# Patient Record
Sex: MALE | Race: WHITE | NOT HISPANIC OR LATINO
[De-identification: names, ages, dates, MRNs, and addresses within clinical notes are randomized per-mention and may not be internally consistent; named-entity substitution may affect disease eponyms.]

---

## 2021-03-17 PROBLEM — Z00.00 ENCOUNTER FOR PREVENTIVE HEALTH EXAMINATION: Status: ACTIVE | Noted: 2021-03-17

## 2021-03-24 ENCOUNTER — APPOINTMENT (OUTPATIENT)
Dept: PULMONOLOGY | Facility: CLINIC | Age: 56
End: 2021-03-24
Payer: COMMERCIAL

## 2021-03-24 VITALS
HEART RATE: 76 BPM | SYSTOLIC BLOOD PRESSURE: 126 MMHG | DIASTOLIC BLOOD PRESSURE: 76 MMHG | WEIGHT: 175 LBS | BODY MASS INDEX: 25.05 KG/M2 | OXYGEN SATURATION: 96 % | HEIGHT: 70 IN | RESPIRATION RATE: 14 BRPM

## 2021-03-24 DIAGNOSIS — R06.02 SHORTNESS OF BREATH: ICD-10-CM

## 2021-03-24 DIAGNOSIS — U07.1 COVID-19: ICD-10-CM

## 2021-03-24 DIAGNOSIS — Z82.3 FAMILY HISTORY OF STROKE: ICD-10-CM

## 2021-03-24 PROCEDURE — 99203 OFFICE O/P NEW LOW 30 MIN: CPT

## 2021-03-24 PROCEDURE — 99072 ADDL SUPL MATRL&STAF TM PHE: CPT

## 2021-03-24 NOTE — HISTORY OF PRESENT ILLNESS
[Shortness of Breath] : Shortness of Breath [Dyspnea] : dyspnea [Fatigue] : fatigue [Weakness] : weakness [Cough] : no cough [Wheezing] : no wheezing [Fever] : no fever [Hemoptysis] : no hemoptysis [Weight Gain] : weight gain [Leg Pain] : no leg pain [Edema] : no edema

## 2021-08-04 ENCOUNTER — APPOINTMENT (OUTPATIENT)
Dept: PULMONOLOGY | Facility: CLINIC | Age: 56
End: 2021-08-04

## 2021-08-04 ENCOUNTER — APPOINTMENT (OUTPATIENT)
Age: 56
End: 2021-08-04

## 2024-04-05 ENCOUNTER — APPOINTMENT (OUTPATIENT)
Dept: OTOLARYNGOLOGY | Facility: CLINIC | Age: 59
End: 2024-04-05
Payer: COMMERCIAL

## 2024-04-05 VITALS — BODY MASS INDEX: 24.68 KG/M2 | WEIGHT: 172 LBS

## 2024-04-05 DIAGNOSIS — H90.3 SENSORINEURAL HEARING LOSS, BILATERAL: ICD-10-CM

## 2024-04-05 PROCEDURE — 92550 TYMPANOMETRY & REFLEX THRESH: CPT | Mod: 52

## 2024-04-05 PROCEDURE — 92557 COMPREHENSIVE HEARING TEST: CPT

## 2024-04-05 PROCEDURE — 99203 OFFICE O/P NEW LOW 30 MIN: CPT

## 2024-04-05 NOTE — PHYSICAL EXAM
[Normal] : mucosa is normal [Midline] : trachea located in midline position [de-identified] : soft lipomatous mass left supraclavicular region over brachial plexus

## 2024-04-05 NOTE — ASSESSMENT
[FreeTextEntry1] : Audio reviewed - mild asymmetry in two frequencies, recheck in 6-12 mo RV after MRI

## 2024-04-05 NOTE — REASON FOR VISIT
[Initial Evaluation] : an initial evaluation for [FreeTextEntry2] : lump left side of neck  Speaking Coherently

## 2024-04-05 NOTE — HISTORY OF PRESENT ILLNESS
[de-identified] : Patient presents today c/o lump on left side of neck.  Patient had a US neck done 02/13/2024.  Has been present for several years, nonpainful, nonenlarging.  Also reports bilateral hearing loss. No otorrhea, otalgia, tinnitus, or vertigo.

## 2024-05-20 ENCOUNTER — TRANSCRIPTION ENCOUNTER (OUTPATIENT)
Age: 59
End: 2024-05-20

## 2024-05-20 ENCOUNTER — APPOINTMENT (OUTPATIENT)
Dept: OTOLARYNGOLOGY | Facility: CLINIC | Age: 59
End: 2024-05-20
Payer: COMMERCIAL

## 2024-05-20 VITALS — BODY MASS INDEX: 24.62 KG/M2 | HEIGHT: 70 IN | WEIGHT: 172 LBS

## 2024-05-20 DIAGNOSIS — R22.1 LOCALIZED SWELLING, MASS AND LUMP, NECK: ICD-10-CM

## 2024-05-20 PROCEDURE — 99214 OFFICE O/P EST MOD 30 MIN: CPT

## 2024-05-20 NOTE — PHYSICAL EXAM
[de-identified] : soft lipomatous mass left supraclavicular region over brachial plexus [Normal] : mucosa is normal [Midline] : trachea located in midline position

## 2024-05-20 NOTE — ASSESSMENT
[FreeTextEntry1] : Discussed option of excision vs. observation Risks of neck surgery were discussed including unsightly scarring, bleeding, neurovascular injury including CN11 and CN12,  infection, general anesthesia, and need for reoperation  Patient opts for observation

## 2024-05-20 NOTE — REASON FOR VISIT
[Subsequent Evaluation] : a subsequent evaluation for [FreeTextEntry2] : neck mass. sensorineural hearing loss

## 2024-05-20 NOTE — HISTORY OF PRESENT ILLNESS
[FreeTextEntry1] : Patient following up today on neck mass. sensorineural hearing loss .  Patient had a MR orbit face and neck done 05/13/2024 and he is here to review the results.

## 2024-05-20 NOTE — DATA REVIEWED
[de-identified] : MR @ [facility] personally reviewed and interpreted demonstrating lipomatous mass involving left supraclavicular region, well encapsulated without invasive or irregular features